# Patient Record
Sex: FEMALE | Race: WHITE
[De-identification: names, ages, dates, MRNs, and addresses within clinical notes are randomized per-mention and may not be internally consistent; named-entity substitution may affect disease eponyms.]

---

## 2018-10-01 NOTE — CT
EXAMINATION TYPE: CT brain wo con

 

DATE OF EXAM: 10/1/2018

 

COMPARISON: None

 

HISTORY: High blood pressure.

 

CT DLP: 1121 mGycm

Automated exposure control for dose reduction was used.

 

FINDINGS: 

Atherosclerotic changes are noted. Artifact overlying the navdeep noted. 

 

Mild to moderate generalized degenerative change. Faint periventricular low attenuation is nonspecifi
c but most typical remote microvascular ischemia.

No sulcal effacement or mass effect. No midline shift.

No acute intracranial hemorrhage.

 

IMPRESSION:

1. Degenerative and nonspecific white matter changes most typical of remote microvascular ischemia.

2. No acute hemorrhage or midline shift.

## 2018-10-01 NOTE — ED
General Adult HPI





- General


Chief complaint: Recheck/Abnormal Lab/Rx


Stated complaint: anxiety


Time Seen by Provider: 10/01/18 13:35


Source: patient, RN notes reviewed


Mode of arrival: ambulatory


Limitations: no limitations





- History of Present Illness


Initial comments: 





This is a 73-year-old female who presents emergency Department complaining that 

over the last 6 months she's had a lot of anxiety in over the last couple 

months has gotten considerably worse.  Patient states her grandson  in 

August and that is stressed her out quite a bit.  Patient states she can't seem 

to do anything without getting upset or worrying about whether she did 

something right.  Patient states she can't function at home because she is so 

anxious lately and having so many panic attacks.  Patient states she's also 

quite a bit of tenderness.  Peripheral last couple of months the tinnitus is 

gotten considerably worse.  Patient denies any headache patient denies numbness 

weakness.  Patient denies any difficulty breathing or chest pain.  Patient 

denies palpitations.  Patient denies nausea vomiting diarrhea.





- Related Data


 Home Medications











 Medication  Instructions  Recorded  Confirmed


 


ALPRAZolam [Xanax] 0.25 mg PO BID PRN 10/01/18 10/01/18


 


Albuterol Inhaler [Ventolin Hfa 1 - 2 puff INHALATION RT-Q6H PRN 10/01/18 10/01/

18





Inhaler]   


 


Carvedilol [Coreg] 6.25 mg PO BID 10/01/18 10/01/18


 


Escitalopram [Lexapro] 5 mg PO DAILY 10/01/18 10/01/18


 


Levothyroxine Sodium [Synthroid] 125 mcg PO DAILY 10/01/18 10/01/18


 


Losartan Potassium 100 mg PO DAILY 10/01/18 10/01/18


 


Simvastatin [Zocor] 30 mg PO DAILY 10/01/18 10/01/18


 


cloNIDine HCL [Catapres] 0.1 mg PO TID PRN 10/01/18 10/01/18











 Allergies











Allergy/AdvReac Type Severity Reaction Status Date / Time


 


No Known Allergies Allergy   Verified 10/01/18 14:17














Review of Systems


ROS Statement: 


Those systems with pertinent positive or pertinent negative responses have been 

documented in the HPI.





ROS Other: All systems not noted in ROS Statement are negative.





Past Medical History


Past Medical History: Hypertension


History of Any Multi-Drug Resistant Organisms: None Reported


Past Surgical History:  Section, Tonsillectomy


Past Psychological History: Anxiety, Depression


Smoking Status: Never smoker


Past Alcohol Use History: Occasional


Past Drug Use History: None Reported





General Exam





- General Exam Comments


Initial Comments: 





GENERAL:


Patient is well-developed and well-nourished.  Patient is nontoxic and well-

hydrated and is in no acute distress.





ENT:


Neck is soft and supple.  No significant lymphadenopathy is noted.  Oropharynx 

is clear.  Moist mucous membranes.  Neck has full range of motion without 

eliciting any pain.  





EYES:


The sclera were anicteric and conjunctiva were pink and moist.  Extraocular 

movements were intact and pupils were equal round and reactive to light.  

Eyelids were unremarkable.





PULMONARY:


Unlabored respirations.  Good breath sounds bilaterally.  No audible rales 

rhonchi or wheezing was noted.





CARDIOVASCULAR:


There is a regular rate and rhythm without any murmurs gallops or rubs.  

Femoral pulses are equal bilaterally





ABDOMEN:


Soft and nontender with normal bowel sounds.  No palpable organomegaly was 

noted.  There is no palpable pulsatile mass.





SKIN:


Skin is clear with no lesions or rashes and otherwise unremarkable.





NEUROLOGIC:


Patient is alert and oriented x3.  Cranial nerves II through XII are grossly 

intact.  Motor and sensory are also intact.  Normal speech, volume and content.

  Symmetrical smile. 





MUSCULOSKELETAL:


Normal extremities with adequate strength and full range of motion.  No lower 

extremity swelling or edema.  No calf tenderness.





LYMPHATICS:


No significant lymphadenopathy is noted





PSYCHIATRIC:


Patient seems very anxious.


Limitations: no limitations





Course


 Vital Signs











  10/01/18 10/01/18





  13:34 15:39


 


Temperature 98.2 F 


 


Pulse Rate 67 74


 


Respiratory 16 18





Rate  


 


Blood Pressure 181/100 210/109


 


O2 Sat by Pulse 100 99





Oximetry  














Medical Decision Making





- Lab Data


Result diagrams: 


 10/01/18 14:05





 10/01/18 14:05


 Lab Results











  10/01/18 10/01/18 Range/Units





  14:05 14:05 


 


WBC  6.2   (3.8-10.6)  k/uL


 


RBC  4.45   (3.80-5.40)  m/uL


 


Hgb  14.1   (11.4-16.0)  gm/dL


 


Hct  42.6   (34.0-46.0)  %


 


MCV  95.6   (80.0-100.0)  fL


 


MCH  31.6   (25.0-35.0)  pg


 


MCHC  33.0   (31.0-37.0)  g/dL


 


RDW  12.1   (11.5-15.5)  %


 


Plt Count  212   (150-450)  k/uL


 


Neutrophils %  68   %


 


Lymphocytes %  20   %


 


Monocytes %  8   %


 


Eosinophils %  2   %


 


Basophils %  0   %


 


Neutrophils #  4.3   (1.3-7.7)  k/uL


 


Lymphocytes #  1.2   (1.0-4.8)  k/uL


 


Monocytes #  0.5   (0-1.0)  k/uL


 


Eosinophils #  0.1   (0-0.7)  k/uL


 


Basophils #  0.0   (0-0.2)  k/uL


 


Sodium   140  (137-145)  mmol/L


 


Potassium   3.9  (3.5-5.1)  mmol/L


 


Chloride   106  ()  mmol/L


 


Carbon Dioxide   24  (22-30)  mmol/L


 


Anion Gap   10  mmol/L


 


BUN   23 H  (7-17)  mg/dL


 


Creatinine   0.98  (0.52-1.04)  mg/dL


 


Est GFR (CKD-EPI)AfAm   65  (>60 ml/min/1.73 sqM)  


 


Est GFR (CKD-EPI)NonAf   57  (>60 ml/min/1.73 sqM)  


 


Glucose   105 H  (74-99)  mg/dL


 


Calcium   10.3 H  (8.4-10.2)  mg/dL


 


Magnesium   1.8  (1.6-2.3)  mg/dL


 


Total Bilirubin   1.3  (0.2-1.3)  mg/dL


 


AST   27  (14-36)  U/L


 


ALT   23  (9-52)  U/L


 


Alkaline Phosphatase   45  ()  U/L


 


Total Protein   7.5  (6.3-8.2)  g/dL


 


Albumin   4.3  (3.5-5.0)  g/dL














Disposition


Clinical Impression: 


 Hypertension, Anxiety





Disposition: HOME SELF-CARE


Instructions:  Anxiety (ED)


Is patient prescribed a controlled substance at d/c from ED?: No


Referrals: 


Nonstaff,Physician [Primary Care Provider] - 1-2 days


Time of Disposition: 16:15

## 2019-09-28 NOTE — XR
EXAMINATION TYPE: XR wrist complete LT , 4 VIEWS

 

DATE OF EXAM ORDERED: 9/28/2019

 

HISTORY: left wrist injury.

 

COMPARISON: None.

 

FINDINGS:  Bones are osteopenic likely on the basis of osteoporosis.

 

There are marked degenerative changes at the first carpal metacarpal joint. There are deformities of 
the distal radius and ulna compatible with previous fracture. No acute fracture or dislocation is see
n.

 

IMPRESSION: 

1. NO ACUTE OSSEOUS LESION.

2. EVIDENCE OF OLD TRAUMA.

3. DEGENERATIVE CHANGE.

## 2019-09-28 NOTE — ED
General Adult HPI





- General


Chief complaint: Extremity Injury, Upper


Stated complaint: left wrist injury


Time Seen by Provider: 19 12:15


Source: patient


Mode of arrival: ambulatory


Limitations: no limitations





- History of Present Illness


Initial comments: 





Patient presents to the ED with her  complaining of having left wrist 

pain since falling out of her bed yesterday afternoon.  Patient states that she 

used her left arm to break her fall.  Patient denies any other injury or site of

pain.  Patient denies head injury, headache, LOC, neck/back/lower extremity/hip 

pain, chest pain, dyspnea, dizziness, abdominal pain, nausea or vomiting, focal 

numbness/weakness/neurological deficit, or any other symptoms or complaints.  

Patient denies taking any medication for her pain today.  Patient states that 

her pain is worse with any movement at her left wrist.





- Related Data


                                Home Medications











 Medication  Instructions  Recorded  Confirmed


 


Carvedilol [Coreg] 6.25 mg PO BID 10/01/18 09/28/19


 


Losartan Potassium 100 mg PO DAILY 10/01/18 09/28/19


 


Donepezil [Aricept] 5 mg PO HS 19


 


Escitalopram Oxalate [Lexapro] 10 mg PO DAILY 19


 


Levothyroxine Sodium [Synthroid] 112 mcg PO DAILY 19


 


Pramipexole [Mirapex] 0.25 mg PO DAILY 19


 


clonazePAM [KlonoPIN] 0.5 mg PO BID 19











                                    Allergies











Allergy/AdvReac Type Severity Reaction Status Date / Time


 


No Known Allergies Allergy   Verified 19 12:29














Review of Systems


ROS Statement: 


Those systems with pertinent positive or pertinent negative responses have been 

documented in the HPI.





ROS Other: All systems not noted in ROS Statement are negative.





Past Medical History


Past Medical History: Hypertension


History of Any Multi-Drug Resistant Organisms: None Reported


Past Surgical History:  Section, Tonsillectomy


Past Psychological History: Anxiety, Depression


Smoking Status: Never smoker


Past Alcohol Use History: Occasional


Past Drug Use History: None Reported





General Exam


Limitations: no limitations


General appearance: alert, in no apparent distress


Head exam: Present: atraumatic, normocephalic


Eye exam: Present: normal appearance, EOMI


Neck exam: Absent: tenderness


Respiratory exam: Present: normal lung sounds bilaterally.  Absent: respiratory 

distress, wheezes, rales, rhonchi


Cardiovascular Exam: Present: regular rate, normal rhythm, normal heart sounds, 

other (Normal radial pulses bilaterally)


GI/Abdominal exam: Present: soft.  Absent: distended, tenderness


Extremities exam: Present: other (Swelling and tenderness is noted over the left

lateral wrist; no left-hand snuffbox tenderness is appreciated; no deformity is 

appreciated)


Back exam: Absent: tenderness


Neurological exam: Present: alert, oriented X3.  Absent: motor sensory deficit


Psychiatric exam: Present: normal affect, normal mood


Skin exam: Present: warm, dry, intact, normal color





Course


                                   Vital Signs











  19





  12:00


 


Temperature 98.2 F


 


Pulse Rate 67


 


Respiratory 18





Rate 


 


Blood Pressure 128/76


 


O2 Sat by Pulse 98





Oximetry 














Medical Decision Making





- Medical Decision Making





Patient's left wrist x-rays show no acute fracture or dislocation.  I suspect t

hat the patient has likely sustained a left wrist sprain.  She was counseled 

about rest, ice, compression, elevation and analgesics.  Patient states that she

will follow up closely with her PCP in [place].  Patient was instructed to 

return to the ED should she develop new or worsening pain or symptoms.





- Radiology Data


Radiology results: image reviewed (Left wrist x-rays show no acute fracture or 

dislocation; degenerative changes are seen)





Disposition


Clinical Impression: 


 Left wrist sprain





Disposition: HOME SELF-CARE


Condition: Stable


Instructions (If sedation given, give patient instructions):  Wrist Injury (ED)


Additional Instructions: 


Return to the emergency room if you develop new or worsening pain or symptoms.


Is patient prescribed a controlled substance at d/c from ED?: No


Referrals: 


Nonstaff,Physician [Primary Care Provider] - 1-2 days


Time of Disposition: 14:05

## 2021-01-23 ENCOUNTER — HOSPITAL ENCOUNTER (EMERGENCY)
Dept: HOSPITAL 47 - EC | Age: 76
Discharge: HOME | End: 2021-01-23
Payer: MEDICARE

## 2021-01-23 VITALS — DIASTOLIC BLOOD PRESSURE: 108 MMHG | HEART RATE: 68 BPM | SYSTOLIC BLOOD PRESSURE: 197 MMHG

## 2021-01-23 VITALS — RESPIRATION RATE: 18 BRPM | TEMPERATURE: 98.2 F

## 2021-01-23 DIAGNOSIS — I10: ICD-10-CM

## 2021-01-23 DIAGNOSIS — G25.3: Primary | ICD-10-CM

## 2021-01-23 DIAGNOSIS — Z79.899: ICD-10-CM

## 2021-01-23 DIAGNOSIS — F41.9: ICD-10-CM

## 2021-01-23 DIAGNOSIS — Z79.890: ICD-10-CM

## 2021-01-23 DIAGNOSIS — F32.9: ICD-10-CM

## 2021-01-23 LAB
ALBUMIN SERPL-MCNC: 3.8 G/DL (ref 3.5–5)
ALP SERPL-CCNC: 50 U/L (ref 38–126)
ALT SERPL-CCNC: 12 U/L (ref 4–34)
ANION GAP SERPL CALC-SCNC: 6 MMOL/L
AST SERPL-CCNC: 23 U/L (ref 14–36)
BASOPHILS # BLD AUTO: 0 K/UL (ref 0–0.2)
BASOPHILS NFR BLD AUTO: 1 %
BUN SERPL-SCNC: 17 MG/DL (ref 7–17)
CALCIUM SPEC-MCNC: 9.6 MG/DL (ref 8.4–10.2)
CHLORIDE SERPL-SCNC: 108 MMOL/L (ref 98–107)
CO2 SERPL-SCNC: 24 MMOL/L (ref 22–30)
EOSINOPHIL # BLD AUTO: 0.1 K/UL (ref 0–0.7)
EOSINOPHIL NFR BLD AUTO: 2 %
ERYTHROCYTE [DISTWIDTH] IN BLOOD BY AUTOMATED COUNT: 4.04 M/UL (ref 3.8–5.4)
ERYTHROCYTE [DISTWIDTH] IN BLOOD: 13.7 % (ref 11.5–15.5)
GLUCOSE SERPL-MCNC: 105 MG/DL (ref 74–99)
HCT VFR BLD AUTO: 40.5 % (ref 34–46)
HGB BLD-MCNC: 13.9 GM/DL (ref 11.4–16)
LYMPHOCYTES # SPEC AUTO: 1.3 K/UL (ref 1–4.8)
LYMPHOCYTES NFR SPEC AUTO: 23 %
MAGNESIUM SPEC-SCNC: 1.9 MG/DL (ref 1.6–2.3)
MCH RBC QN AUTO: 34.3 PG (ref 25–35)
MCHC RBC AUTO-ENTMCNC: 34.2 G/DL (ref 31–37)
MCV RBC AUTO: 100.4 FL (ref 80–100)
MONOCYTES # BLD AUTO: 0.5 K/UL (ref 0–1)
MONOCYTES NFR BLD AUTO: 8 %
NEUTROPHILS # BLD AUTO: 3.7 K/UL (ref 1.3–7.7)
NEUTROPHILS NFR BLD AUTO: 65 %
PLATELET # BLD AUTO: 253 K/UL (ref 150–450)
POTASSIUM SERPL-SCNC: 4.3 MMOL/L (ref 3.5–5.1)
PROT SERPL-MCNC: 6.9 G/DL (ref 6.3–8.2)
SODIUM SERPL-SCNC: 138 MMOL/L (ref 137–145)
WBC # BLD AUTO: 5.7 K/UL (ref 3.8–10.6)

## 2021-01-23 PROCEDURE — 36415 COLL VENOUS BLD VENIPUNCTURE: CPT

## 2021-01-23 PROCEDURE — 83735 ASSAY OF MAGNESIUM: CPT

## 2021-01-23 PROCEDURE — 85025 COMPLETE CBC W/AUTO DIFF WBC: CPT

## 2021-01-23 PROCEDURE — 93005 ELECTROCARDIOGRAM TRACING: CPT

## 2021-01-23 PROCEDURE — 99285 EMERGENCY DEPT VISIT HI MDM: CPT

## 2021-01-23 PROCEDURE — 80053 COMPREHEN METABOLIC PANEL: CPT

## 2021-01-23 PROCEDURE — 84443 ASSAY THYROID STIM HORMONE: CPT

## 2021-01-23 PROCEDURE — 70450 CT HEAD/BRAIN W/O DYE: CPT

## 2021-01-23 NOTE — ED
General Adult HPI





- General


Chief complaint: Recheck/Abnormal Lab/Rx


Stated complaint: tremors/ears are ringing


Time Seen by Provider: 21 12:16


Source: patient


Mode of arrival: wheelchair


Limitations: no limitations





- History of Present Illness


Initial comments: 





Patient presents the ED with her  for evaluation.  Patient states for the

past 3-4 weeks or so, she has been experiencing bilatertal lower extremity 

spasms/jerking movements upon waking up in the morning.  Patient states that her

symptoms typically resolve after about an hour or so, but she states that her 

symptoms have not resolved yet today.  Patient denies having any other symptoms 

or complaints.  Patient denies having any pain, trauma or injury, fever or 

chills, headache, focal numbness/weakness/neuro deficit, visual changes, speech 

difficulty, neck/back/extremity pain, chest pain, dyspnea, palpitations, 

dizziness, abdominal pain, nausea/vomiting/diarrhea, dysuria or urinary 

symptoms, incontinence or urinary retention, or any other symptoms or 

complaints.  Patient denies any recent change in her medications.





- Related Data


                                Home Medications











 Medication  Instructions  Recorded  Confirmed


 


Losartan Potassium 100 mg PO DAILY 10/01/18 09/28/19


 


carvediloL [Coreg] 6.25 mg PO BID 10/01/18 09/28/19


 


Donepezil [Aricept] 5 mg PO HS 19


 


Escitalopram Oxalate [Lexapro] 10 mg PO DAILY 19


 


Levothyroxine Sodium [Synthroid] 112 mcg PO DAILY 19


 


Pramipexole [Mirapex] 0.25 mg PO DAILY 19


 


clonazePAM [KlonoPIN] 0.5 mg PO BID 19











                                    Allergies











Allergy/AdvReac Type Severity Reaction Status Date / Time


 


No Known Allergies Allergy   Verified 19 12:29














Review of Systems


ROS Statement: 


Those systems with pertinent positive or pertinent negative responses have been 

documented in the HPI.





ROS Other: All systems not noted in ROS Statement are negative.





Past Medical History


Past Medical History: Hypertension


History of Any Multi-Drug Resistant Organisms: None Reported


Past Surgical History:  Section, Tonsillectomy


Past Psychological History: Anxiety, Depression


Smoking Status: Never smoker


Past Alcohol Use History: Occasional


Past Drug Use History: None Reported





General Exam


Limitations: no limitations


General appearance: alert, in no apparent distress


Head exam: Present: atraumatic, normocephalic


Eye exam: Present: normal appearance, PERRL, EOMI


ENT exam: Present: mucous membranes moist


Neck exam: Present: other (Trachea is in midline).  Absent: tenderness, 

meningismus


Respiratory exam: Present: normal lung sounds bilaterally.  Absent: respiratory 

distress, wheezes, rales, rhonchi, stridor


Cardiovascular Exam: Present: regular rate, normal rhythm, normal heart sounds, 

other (Normal radial pulses bilaterally)


GI/Abdominal exam: Present: soft.  Absent: distended, tenderness, guarding


Extremities exam: Present: full ROM.  Absent: tenderness, pedal edema, calf 

tenderness


Back exam: Present: full ROM.  Absent: tenderness


Neurological exam: Present: alert, oriented X3, CN II-XII intact, other 

(Occasional myoclonic jerking movements are noted to bilateral lower 

extremities).  Absent: motor sensory deficit


Psychiatric exam: Present: normal affect, normal mood


Skin exam: Present: warm, dry, intact, normal color





Course


                                   Vital Signs











  21





  12:09 12:57 13:34


 


Temperature 98.2 F  


 


Pulse Rate 72 67 68


 


Respiratory 18 18 18





Rate   


 


Blood Pressure 203/112 188/112 197/108


 


O2 Sat by Pulse 100 100 98





Oximetry   














- Reevaluation(s)


Reevaluation #1: 





21 13:44


Patient denies development of any new symptoms while in the ED.  Patient remains

 alert and breathing comfortably.  Patient continues to have a nonfocal 

neurological exam.  Patient's blood pressure remains elevated, but has improved 

somewhat since her initial measurement.  Patient however has remained a

symptomatic in terms of her elevated blood pressure (patient denies headache, 

focal neuro deficit, chest pain, dyspnea, dizziness).  Patient and  are 

aware the patient's test results, and patient feels comfortable going home with 

her  at this time.





EKG Findings





- EKG Comments:


EKG Findings:: Sinus bradycardia, ventricular rate of 59 bpm, no ectopy, normal 

MD and QRS intervals, normal QT interval, normal axis, nonspecific T-wave 

abnormality, no ST abnormality





Medical Decision Making





- Medical Decision Making





Patient's head CT, EKG and labs are all fairly unremarkable.  The etiology of 

the patient's lower extremity myoclonic movements is unclear at this time.  

Possibilities include restless leg syndrome, developing movement disorder and 

medication reaction.  I do not suspect an emergent medical condition as the 

etiology of the patient's symptoms.  Patient and  were counseled about 

myoclonic movements/restless leg syndrome and hypertension.  Patient was 

instructed to follow up closely with her primary care provider.  Patient was 

clearly explained return and follow-up instructions, and she feels comfortable 

this plan.





- Lab Data


Result diagrams: 


                                 21 12:51





                                 21 12:51


                                   Lab Results











  21 Range/Units





  12:51 12:51 


 


WBC  5.7   (3.8-10.6)  k/uL


 


RBC  4.04   (3.80-5.40)  m/uL


 


Hgb  13.9   (11.4-16.0)  gm/dL


 


Hct  40.5   (34.0-46.0)  %


 


MCV  100.4 H   (80.0-100.0)  fL


 


MCH  34.3   (25.0-35.0)  pg


 


MCHC  34.2   (31.0-37.0)  g/dL


 


RDW  13.7   (11.5-15.5)  %


 


Plt Count  253   (150-450)  k/uL


 


MPV  6.9   


 


Neutrophils %  65   %


 


Lymphocytes %  23   %


 


Monocytes %  8   %


 


Eosinophils %  2   %


 


Basophils %  1   %


 


Neutrophils #  3.7   (1.3-7.7)  k/uL


 


Lymphocytes #  1.3   (1.0-4.8)  k/uL


 


Monocytes #  0.5   (0-1.0)  k/uL


 


Eosinophils #  0.1   (0-0.7)  k/uL


 


Basophils #  0.0   (0-0.2)  k/uL


 


Macrocytosis  Slight   


 


Sodium   138  (137-145)  mmol/L


 


Potassium   4.3  (3.5-5.1)  mmol/L


 


Chloride   108 H  ()  mmol/L


 


Carbon Dioxide   24  (22-30)  mmol/L


 


Anion Gap   6  mmol/L


 


BUN   17  (7-17)  mg/dL


 


Creatinine   1.07 H  (0.52-1.04)  mg/dL


 


Est GFR (CKD-EPI)AfAm   58  (>60 ml/min/1.73 sqM)  


 


Est GFR (CKD-EPI)NonAf   51  (>60 ml/min/1.73 sqM)  


 


Glucose   105 H  (74-99)  mg/dL


 


Calcium   9.6  (8.4-10.2)  mg/dL


 


Magnesium   1.9  (1.6-2.3)  mg/dL


 


Total Bilirubin   1.0  (0.2-1.3)  mg/dL


 


AST   23  (14-36)  U/L


 


ALT   12  (4-34)  U/L


 


Alkaline Phosphatase   50  ()  U/L


 


Total Protein   6.9  (6.3-8.2)  g/dL


 


Albumin   3.8  (3.5-5.0)  g/dL


 


TSH   3.460  (0.465-4.680)  mIU/L














- Radiology Data


Radiology results: report reviewed (Noncontrast CT head: No acute intracranial 

process, no interval change)





Disposition


Clinical Impression: 


 Myoclonus, Hypertension





Disposition: HOME SELF-CARE


Condition: Stable


Instructions (If sedation given, give patient instructions):  Restless Legs 

Syndrome (ED), Hypertension (ED)


Additional Instructions: 


Return to the ER immediately should you develop any significant pain, a fever, 

numbness or weakness, shortness of breath, vomiting, feeling dizzy or faint, or 

new or worsening symptoms.  Follow up closely with your primary care provider.


Is patient prescribed a controlled substance at d/c from ED?: No


Referrals: 


None,Stated [REFERRING] - 1-2 days


Dayna Cohen MD [REFERRING] - 1-2 days


Time of Disposition: 13:47

## 2021-01-23 NOTE — CT
EXAMINATION TYPE: CT brain wo con

 

DATE OF EXAM: 1/23/2021

 

COMPARISON: 10/1/2018

 

INDICATION: Lower extremity myoclonic jerks

 

DLP: 1099.4 mGycm, Automated exposure control for dose reduction was used.

 

CONTRAST: None

 

CT of the brain is performed utilizing 3 mm thick sections through the posterior fossa and 3 mm thick
 sections through the remaining calvarium.  Study is performed within 24 hours of arrival to the hosp
ital. 

 

No abnormal hyperdensity is present to suggest an acute intracranial hemorrhage.

No mass lesion is evident.

No acute infarcts are evident. Minimal white matter change may be present. No significant progression
 is evident.

Ventricles and sulci are appropriate for the patient age.  

Paranasal sinuses and mastoid air cells within the field-of-view are clear.

 

IMPRESSIONS:

1.   No acute intracranial process.

2. No interval change.

## 2021-11-01 ENCOUNTER — HOSPITAL ENCOUNTER (EMERGENCY)
Dept: HOSPITAL 47 - EC | Age: 76
Discharge: HOME | End: 2021-11-01
Payer: MEDICARE

## 2021-11-01 VITALS — SYSTOLIC BLOOD PRESSURE: 144 MMHG | RESPIRATION RATE: 20 BRPM | DIASTOLIC BLOOD PRESSURE: 79 MMHG | HEART RATE: 87 BPM

## 2021-11-01 DIAGNOSIS — S05.01XA: Primary | ICD-10-CM

## 2021-11-01 DIAGNOSIS — Z90.89: ICD-10-CM

## 2021-11-01 DIAGNOSIS — F03.90: ICD-10-CM
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
## 2021-11-01 DIAGNOSIS — I10: ICD-10-CM

## 2021-11-01 DIAGNOSIS — F32.9: ICD-10-CM

## 2021-11-01 DIAGNOSIS — F41.9: ICD-10-CM

## 2021-11-01 PROCEDURE — 99283 EMERGENCY DEPT VISIT LOW MDM: CPT

## 2021-11-01 NOTE — ED
Eye Problem HPI





- General


Chief complaint: Eye Problems


Stated complaint: Eye injury


Time Seen by Provider: 21 18:34


Source: patient, RN notes reviewed


Mode of arrival: ambulatory


Limitations: no limitations





- History of Present Illness


Initial comments: 





Patient is a 76-year-old female with history of dementia, presenting to the 

emergency Department with complaints of right eye irritation for the past 2 

days.  Patient is here with her caregiver that helps her during the week.  

Patient does have issues with short-term memory.  Caregiver states that she has 

been complaining of right eye irritation since yesterday.  The patient does go 

outside in her garden a lot and carries in leaves and pine cones/needles from a 

nearby tree and put some in vases in her house.  Caregiver thinks that she may 

have poked her eye with one of these.  She does not wear contacts, she does 

admit to some occasional blurry vision when her eye boss.  No fevers or 

chills.  She has no further complaints.





- Related Data


                                Home Medications











 Medication  Instructions  Recorded  Confirmed


 


Losartan Potassium 100 mg PO DAILY 10/01/18 09/28/19


 


carvediloL [Coreg] 6.25 mg PO BID 10/01/18 09/28/19


 


Donepezil [Aricept] 5 mg PO HS 19


 


Escitalopram Oxalate [Lexapro] 10 mg PO DAILY 19


 


Levothyroxine Sodium [Synthroid] 112 mcg PO DAILY 19


 


Pramipexole [Mirapex] 0.25 mg PO DAILY 19


 


clonazePAM [KlonoPIN] 0.5 mg PO BID 19








                                  Previous Rx's











 Medication  Instructions  Recorded


 


Erythromycin Ophth Oint [Romycin 1 applic RIGHT EYE QID 5 Days #1 gm 21





Ophth Oint]  











                                    Allergies











Allergy/AdvReac Type Severity Reaction Status Date / Time


 


No Known Allergies Allergy   Verified 19 12:29














Review of Systems


ROS Statement: 


Those systems with pertinent positive or pertinent negative responses have been 

documented in the HPI.





ROS Other: All systems not noted in ROS Statement are negative.





Past Medical History


Past Medical History: Hypertension, Memory Impairment


Additional Past Medical History / Comment(s): nerve disorder


History of Any Multi-Drug Resistant Organisms: None Reported


Past Surgical History:  Section, Tonsillectomy


Past Psychological History: Anxiety, Depression


Smoking Status: Never smoker


Past Alcohol Use History: Occasional


Past Drug Use History: None Reported





General Exam





- General Exam Comments


Initial Comments: 





GENERAL: 


Patient is well-developed and well-nourished.  Patient is nontoxic and in no 

acute distress.





HEAD: 


Atraumatic, normocephalic.





EYES:


Pupils equal round and reactive to light, extraocular movements intact, sclera 

anicteric.   Eyelids were unremarkable.  Right eye is slightly injected, there 

is a visible corneal abrasion across the center of her eye.  There is no sign 

without signing.





ENT: 


 Moist mucous membranes.





LUNGS:


Unlabored respirations.  Breath sounds clear to auscultation bilaterally and 

equal.  No wheezes rales or rhonchi.





HEART:


Regular rate and rhythm without murmurs, rubs or gallops.





SKIN:


 Warm, Dry, normal turgor, no rashes or lesions noted.


Limitations: no limitations





Course


                                   Vital Signs











  21





  16:10


 


Temperature 58 F L


 


Pulse Rate 58 L


 


Respiratory 18





Rate 


 


Blood Pressure 166/92


 


O2 Sat by Pulse 98





Oximetry 














Medical Decision Making





- Medical Decision Making





Patient is a 76-year-old female here with right eye irritation for the past 2 

days.  She is a visible corneal abrasion the center of her eye, no Jacqui sign. 

Patient will be given erythromycin ointment, recommend following up with eye 

doctor.  Patient and patient's caretaker are in agreement with this plan and 

care.  She is stable for discharge.





Disposition


Clinical Impression: 


 Right corneal abrasion





Disposition: HOME SELF-CARE


Condition: Stable


Instructions (If sedation given, give patient instructions):  Corneal Abrasion 

(ED)


Additional Instructions: 


Please return to the Emergency Department if symptoms worsen or any other 

concerns.


Use antibiotic ointment on the right eye as prescribed.  


If symptoms persist without improvement, follow up with your eye doctor.


Prescriptions: 


Erythromycin Ophth Oint [Romycin Ophth Oint] 1 applic RIGHT EYE QID 5 Days #1 gm


Is patient prescribed a controlled substance at d/c from ED?: No


Referrals: 


Elier James MD [Primary Care Provider] - 1-2 days


Time of Disposition: 18:41